# Patient Record
Sex: FEMALE | Race: WHITE | Employment: UNEMPLOYED | URBAN - METROPOLITAN AREA
[De-identification: names, ages, dates, MRNs, and addresses within clinical notes are randomized per-mention and may not be internally consistent; named-entity substitution may affect disease eponyms.]

---

## 2022-06-01 ENCOUNTER — OFFICE VISIT (OUTPATIENT)
Dept: UROGYNECOLOGY | Age: 22
End: 2022-06-01
Payer: COMMERCIAL

## 2022-06-01 VITALS
OXYGEN SATURATION: 100 % | TEMPERATURE: 98.7 F | SYSTOLIC BLOOD PRESSURE: 120 MMHG | DIASTOLIC BLOOD PRESSURE: 72 MMHG | HEART RATE: 64 BPM

## 2022-06-01 DIAGNOSIS — K40.90 LEFT INGUINAL HERNIA: Primary | ICD-10-CM

## 2022-06-01 DIAGNOSIS — N75.0 CYST OF LEFT BARTHOLIN'S GLAND: ICD-10-CM

## 2022-06-01 PROCEDURE — 99202 OFFICE O/P NEW SF 15 MIN: CPT | Performed by: OBSTETRICS & GYNECOLOGY

## 2022-06-01 NOTE — PROGRESS NOTES
6/1/2022      HPI:     Name: Thuy Ortiz  YOB: 2000    CC: Patient is a 24 y.o. female who is seen in consultation from Hugo Shea MD   for evaluation of recurrent bulge to left labia. Per patient this is an intermittent occurrence every 3-6 months. Per patient it occurs to the left labia minora and turns into a mass with swelling and drainage. It affects her ability to walk at times. HPI:  Bladder control problem: No   Bladder emptying problems:  No   Prolapse/Vaginal Support problems: No   Bowel problem(s): No   Sexual History:  reports being sexually active. Pelvic Pain:  Yes  Where is your pain? Vagina  How long have you had pelvic pain? 2 years  Is your pain relieved by bladder emptying? No  Do you have pain with urination? No  Does anything relieve the pain? Yes       Ob/Gyn History:    OB History   No obstetric history on file. Past Medical History: History reviewed. No pertinent past medical history. Past Surgical History: History reviewed. No pertinent surgical history. Allergies: No Known Allergies  Current Medications:  No current outpatient medications on file. No current facility-administered medications for this visit.      Social History:   Social History     Socioeconomic History    Marital status: Unknown     Spouse name: Not on file    Number of children: Not on file    Years of education: Not on file    Highest education level: Not on file   Occupational History    Not on file   Tobacco Use    Smoking status: Never Smoker    Smokeless tobacco: Never Used   Substance and Sexual Activity    Alcohol use: Yes     Comment: ocassionally    Drug use: Never    Sexual activity: Yes   Other Topics Concern    Not on file   Social History Narrative    Not on file     Social Determinants of Health     Financial Resource Strain:     Difficulty of Paying Living Expenses: Not on file   Food Insecurity:     Worried About [address] in the Last Year: Not on file    Ran Out of Food in the Last Year: Not on file   Transportation Needs:     Lack of Transportation (Medical): Not on file    Lack of Transportation (Non-Medical): Not on file   Physical Activity:     Days of Exercise per Week: Not on file    Minutes of Exercise per Session: Not on file   Stress:     Feeling of Stress : Not on file   Social Connections:     Frequency of Communication with Friends and Family: Not on file    Frequency of Social Gatherings with Friends and Family: Not on file    Attends [employer] Services: Not on file    Active Member of [address] [place] [address] [employer] or Organizations: Not on file    Attends Club or Organization Meetings: Not on file    Marital Status: Not on file   Intimate Partner Violence:     Fear of Current or Ex-Partner: Not on file    Emotionally Abused: Not on file    Physically Abused: Not on file    Sexually Abused: Not on file   Housing Stability:     Unable to Pay for Housing in the Last Year: Not on file    Number of [place] in the Last Year: Not on file    Unstable Housing in the Last Year: Not on file     Family History: History reviewed. No pertinent family history. Review of Systems:  Review of Systems   All other systems reviewed and are negative. Objective:     Vital Signs  Vitals:    06/01/22 1450   BP: 120/72   Pulse: 64   Temp: 98.7 °F (37.1 °C)   SpO2: 100%     Physical Exam  Physical Exam  HENT:      Head: Normocephalic and atraumatic. Eyes:      Conjunctiva/sclera: Conjunctivae normal.   Pulmonary:      Effort: Pulmonary effort is normal.   Abdominal:      Palpations: Abdomen is soft. Genitourinary:     Comments: Left labia minora and around the Bartholin's gland slightly enlarged and more fibrous feeling than on the right. Musculoskeletal:      Cervical back: Normal range of motion and neck supple. Skin:     General: Skin is warm and dry. Neurological:      Mental Status: She is alert and oriented to person, place, and time. Assessment/Plan:     Ketan Calvo is a 24 y.o. female with   1. Left inguinal hernia    2. Cyst of left Bartholin's gland    Old records reviewed, outside records reviewed    Ana Kuo presents today with recurrent bulge in the left side of the vagina. It has been drained multiple times and treated as a Bartholin's gland cyst.  She does have a picture of it on her phone and the left labia minora becomes very enlarged. She does state that it has broken open on its own and it tends to have kind of a yellowish clear fluid. I am uncertain if this is a Mir's duct cyst versus a Bartholin's gland cyst, versus hernia from the canal of knuck. 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 We may consider referral to general surgery for evaluation of a potential inguinal hernia. No orders of the defined types were placed in this encounter. No orders of the defined types were placed in this encounter.       Jo Lama MD

## 2022-07-19 ENCOUNTER — TELEPHONE (OUTPATIENT)
Dept: UROGYNECOLOGY | Age: 22
End: 2022-07-19

## 2022-07-19 DIAGNOSIS — N75.0 CYST OF LEFT BARTHOLIN'S GLAND: ICD-10-CM

## 2022-07-19 DIAGNOSIS — K40.90 LEFT INGUINAL HERNIA: Primary | ICD-10-CM

## 2022-07-19 NOTE — TELEPHONE ENCOUNTER
[facility] ob/gyn sent patient's MRI pelvic results. Per Dr. [address] he would like for patient to see Dr. Mathieu Mcclure (general surgeon) at [facility].  Referral order placed. Dr. [address] did leave the patient a message.

## 2022-08-08 ENCOUNTER — OFFICE VISIT (OUTPATIENT)
Dept: SURGERY | Age: 22
End: 2022-08-08
Payer: COMMERCIAL

## 2022-08-08 VITALS — DIASTOLIC BLOOD PRESSURE: 71 MMHG | HEART RATE: 61 BPM | SYSTOLIC BLOOD PRESSURE: 115 MMHG | WEIGHT: 119 LBS

## 2022-08-08 DIAGNOSIS — N90.7 LABIAL CYST: Primary | ICD-10-CM

## 2022-08-08 PROCEDURE — 99243 OFF/OP CNSLTJ NEW/EST LOW 30: CPT | Performed by: SURGERY

## 2022-08-08 NOTE — Clinical Note
Elizabeth Roach,  I just saw Ms. Lucy Deluca in the office for her left labial cyst/swelling. I do not appreciate a hernia on exam and her history isn't consistent with a persistent Mir's duct cyst (from my understanding). I am getting a CT scan to better evaluate her labia and left inguinal canal.  If negative for hernia or fluid filled structure of the inguinal canal, then I think it is most likely localized to the left labia. Thank you for allowing me to take care of Ms. Nico Salgado with you.   Gunnar Rader

## 2022-08-08 NOTE — PROGRESS NOTES
New Patient [facility] [facility]   Rodolfo Causey MD    [address]  Aric Meneses  Phone: [phone]  Fax: [phone]    Thuy Owen   YOB: 2000    Date of Visit:  8/8/2022    [place]  No primary care provider on file. HPI:   Inguinal Hernia: Thuy Owen is 24 y.o. female seen at request of Jeff Alfred MD.  She presents for evaluation of a possible left inguinal hernia. Symptoms were first noted a few years ago. She has had intermittent labial swelling requiring I&D multiple times. Her swelling is limited to her left labia but sometimes feels like it goes superior from there. She denies any bulge in her groin or any change with activity. She only has pain during flare-up. No Known Allergies  No outpatient medications have been marked as taking for the 8/8/22 encounter (Office Visit) with Luisa Saab MD.       History reviewed. No pertinent past medical history. History reviewed. No pertinent surgical history. History reviewed. No pertinent family history.   Social History     Socioeconomic History    Marital status: Unknown     Spouse name: Not on file    Number of children: Not on file    Years of education: Not on file    Highest education level: Not on file   Occupational History    Not on file   Tobacco Use    Smoking status: Never    Smokeless tobacco: Never   Substance and Sexual Activity    Alcohol use: Yes     Comment: ocassionally    Drug use: Never    Sexual activity: Yes   Other Topics Concern    Not on file   Social History Narrative    Not on file     Social Determinants of Health     Financial Resource Strain: Not on file   Food Insecurity: Not on file   Transportation Needs: Not on file   Physical Activity: Not on file   Stress: Not on file   Social Connections: Not on file   Intimate Partner Violence: Not on file   Housing Stability: Not on file Vitals:    08/08/22 1513   BP: 115/71   Pulse: 61   Weight: 119 lb (54 kg)     There is no height or weight on file to calculate BMI. Wt Readings from Last 3 Encounters:   08/08/22 119 lb (54 kg)     BP Readings from Last 3 Encounters:   08/08/22 115/71   06/01/22 120/72          REVIEW OF SYSTEMS:   Pertinent positives are in HPI, otherwise all systems reviewed and negative    PHYSICAL EXAM:    CONSTITUTIONAL:  awake, alert, no apparent distress and normal weight  ENT:  normocephalic, without obvious abnormality  NECK:  supple, symmetrical, trachea midline   LUNGS:  Resp easy and unlabored  CARDIOVASCULAR:  regular rate and rhythm  ABDOMEN:  soft, NT, ND, no inguinal hernia appreciated  External exam of left labia and mons without obvious mass    MUSCULOSKELETAL: No edema  NEUROLOGIC:  Mental Status Exam:  Level of Alertness:   awake  Orientation:   person, place, time      DATA:  MRI from outside hospital shows inflammation of labia without inguinal hernia noted    ASSESSMENT:     Diagnosis Orders   1. Labial cyst  CT PELVIS W CONTRAST            PLAN:    Ms. Aston Stratton has a probable Bartholin's cyst.  I do not appreciate a hernia or mass on exam.  She states when there is inflammation it does not extend up to her inguinal canal, which is what I would expect for a Mir's duct cyst. MRI was negative for hernia. Will obtain CT pelvis to better evaluate area during time without inflammation and to rule out any inguinal pathology.       Electronically signed by Manisha Altamirano MD on 8/8/2022